# Patient Record
Sex: MALE | Race: OTHER | NOT HISPANIC OR LATINO
[De-identification: names, ages, dates, MRNs, and addresses within clinical notes are randomized per-mention and may not be internally consistent; named-entity substitution may affect disease eponyms.]

---

## 2020-10-19 PROBLEM — Z00.00 ENCOUNTER FOR PREVENTIVE HEALTH EXAMINATION: Status: ACTIVE | Noted: 2020-10-19

## 2020-10-20 ENCOUNTER — APPOINTMENT (OUTPATIENT)
Dept: SURGERY | Facility: CLINIC | Age: 30
End: 2020-10-20
Payer: COMMERCIAL

## 2020-10-20 VITALS
BODY MASS INDEX: 24.34 KG/M2 | TEMPERATURE: 97.7 F | HEART RATE: 77 BPM | SYSTOLIC BLOOD PRESSURE: 120 MMHG | DIASTOLIC BLOOD PRESSURE: 75 MMHG | WEIGHT: 170 LBS | OXYGEN SATURATION: 98 % | HEIGHT: 70 IN

## 2020-10-20 DIAGNOSIS — L05.02 PILONIDAL SINUS WITH ABSCESS: ICD-10-CM

## 2020-10-20 DIAGNOSIS — Z78.9 OTHER SPECIFIED HEALTH STATUS: ICD-10-CM

## 2020-10-20 DIAGNOSIS — F17.200 NICOTINE DEPENDENCE, UNSPECIFIED, UNCOMPLICATED: ICD-10-CM

## 2020-10-20 DIAGNOSIS — Z87.2 PERSONAL HISTORY OF DISEASES OF THE SKIN AND SUBCUTANEOUS TISSUE: ICD-10-CM

## 2020-10-20 PROCEDURE — 99202 OFFICE O/P NEW SF 15 MIN: CPT

## 2020-10-20 NOTE — ASSESSMENT
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 Patient was explained the options. All risks and benefits were explained and alternate treatment discussed the patient. Patient will be scheduled for outpatient elective excision of pilonidal cyst and sinus. Informed consent was obtained. Patient was suggested for quitting smoking and he said he is already done that

## 2020-10-20 NOTE — HISTORY OF PRESENT ILLNESS
[de-identified] : This 30-year-old male presents for ablation of a lump on the left side of the buttock for about 2 months. Patient states he started the pain and was seen in urgent care and since then he has been draining intermittently off and on with the pain. Patient has difficulty sleeping on the left side at times. Denies any fever or hospitalization. Patient works  with [employer] company has has to sit and stand all day.

## 2020-10-20 NOTE — PHYSICAL EXAM
[de-identified] : Healthy-looking young male ambulatory in no acute discomfort [de-identified] : Sinus tract with induration over the left side of the midline with multiple small sinuses in the midline consistent with pilonidal sinus and underlying cyst. No active pus drainage was  noted today.

## 2020-11-13 ENCOUNTER — APPOINTMENT (OUTPATIENT)
Dept: SURGERY | Facility: HOSPITAL | Age: 30
End: 2020-11-13

## 2020-11-24 ENCOUNTER — APPOINTMENT (OUTPATIENT)
Dept: SURGERY | Facility: CLINIC | Age: 30
End: 2020-11-24

## 2022-01-05 ENCOUNTER — NON-APPOINTMENT (OUTPATIENT)
Age: 32
End: 2022-01-05